# Patient Record
Sex: MALE | Race: WHITE | NOT HISPANIC OR LATINO | Employment: FULL TIME | URBAN - METROPOLITAN AREA
[De-identification: names, ages, dates, MRNs, and addresses within clinical notes are randomized per-mention and may not be internally consistent; named-entity substitution may affect disease eponyms.]

---

## 2021-09-23 ENCOUNTER — OFFICE VISIT (OUTPATIENT)
Dept: URGENT CARE | Facility: PHYSICIAN GROUP | Age: 46
End: 2021-09-23
Payer: COMMERCIAL

## 2021-09-23 ENCOUNTER — APPOINTMENT (OUTPATIENT)
Dept: RADIOLOGY | Facility: IMAGING CENTER | Age: 46
End: 2021-09-23
Attending: PHYSICIAN ASSISTANT
Payer: COMMERCIAL

## 2021-09-23 VITALS
OXYGEN SATURATION: 97 % | DIASTOLIC BLOOD PRESSURE: 76 MMHG | TEMPERATURE: 97.4 F | SYSTOLIC BLOOD PRESSURE: 112 MMHG | HEART RATE: 76 BPM | BODY MASS INDEX: 32.47 KG/M2 | RESPIRATION RATE: 18 BRPM | WEIGHT: 245 LBS | HEIGHT: 73 IN

## 2021-09-23 DIAGNOSIS — S99.922A INJURY OF TOE ON LEFT FOOT, INITIAL ENCOUNTER: ICD-10-CM

## 2021-09-23 DIAGNOSIS — M79.675 TOE PAIN, LEFT: ICD-10-CM

## 2021-09-23 PROCEDURE — 99204 OFFICE O/P NEW MOD 45 MIN: CPT | Performed by: PHYSICIAN ASSISTANT

## 2021-09-23 PROCEDURE — 73660 X-RAY EXAM OF TOE(S): CPT | Mod: TC,LT | Performed by: PHYSICIAN ASSISTANT

## 2021-09-23 NOTE — PROGRESS NOTES
"Subjective:   Junior Kumari is a 45 y.o. male who presents for Foot Pain (possible broken pinky toe,left foot,painful,x3 weeks.)      HPI  45 y.o. male presents to urgent care with new problem to provider of injury to left small toe that occurred about 3 weeks ago.  Patient reports he was walking down a bleacher and kicked a chair causing him to fall forward.  Patient caught himself before falling to the ground and states he had a sudden onset of left small toe pain.  He reports swelling.  Denies previous injury.  He has been taking Tylenol for pain.  Pain is worse with direct pressure from shoes and walking. Denies other associated aggravating or alleviating factors.     Review of Systems   Musculoskeletal:        Toe injury       There is no problem list on file for this patient.    History reviewed. No pertinent surgical history.  Social History     Tobacco Use   • Smoking status: Not on file   Substance Use Topics   • Alcohol use: Not on file   • Drug use: Not on file      History reviewed. No pertinent family history.   (Allergies, Medications, & Tobacco/Substance Use were reconciled by the Medical Assistant and reviewed by myself. The family history is prepopulated)     Objective:     /76 (BP Location: Left arm, Patient Position: Sitting, BP Cuff Size: Adult)   Pulse 76   Temp 36.3 °C (97.4 °F) (Temporal)   Resp 18   Ht 1.854 m (6' 1\")   Wt 111 kg (245 lb)   SpO2 97%   BMI 32.32 kg/m²     Physical Exam  Vitals reviewed.   Constitutional:       Appearance: Normal appearance. He is well-developed.   HENT:      Head: Normocephalic and atraumatic.   Eyes:      Conjunctiva/sclera: Conjunctivae normal.   Cardiovascular:      Rate and Rhythm: Normal rate.   Pulmonary:      Effort: Pulmonary effort is normal. No respiratory distress.   Musculoskeletal:      Cervical back: Normal range of motion and neck supple.        Feet:    Skin:     General: Skin is warm and dry.   Neurological:      Mental " Status: He is alert and oriented to person, place, and time.   Psychiatric:         Mood and Affect: Mood normal.         Behavior: Behavior normal.         Thought Content: Thought content normal.         Judgment: Judgment normal.         Assessment/Plan:     1. Injury of toe on left foot, initial encounter  DX-TOE(S) 2+ LEFT     Narrative & Impression  9/23/2021 9:19 AM  HISTORY/REASON FOR EXAM:  Pain/Deformity Following Trauma; pinky toe injury x 3 weeks.        TECHNIQUE/EXAM DESCRIPTION AND NUMBER OF VIEWS:  3 views of the LEFT toes.  COMPARISON: None  FINDINGS:  No fracture or dislocation is seen. There is an accessory ossicle adjacent to the navicula. Alignment is maintained.     IMPRESSION:  No fracture or dislocation is seen.      Recommend lucius taping of toes. Consider padding for total and shoes.  Referral to podiatry for further evaluation. Weightbearing and activity as tolerated.  Continue with OTC Tylenol and/or ibuprofen for pain.  Patient may apply ice and elevate.  Differential diagnosis, natural history, supportive care, and indications for immediate follow-up discussed.    Advised the patient to follow-up with the primary care physician for recheck, reevaluation, and consideration of further management.  Patient verbalized understanding of treatment plan and has no further questions regarding care.     I personally reviewed prior external notes and test results pertinent to today's visit.     Please note that this dictation was created using voice recognition software. I have made a reasonable attempt to correct obvious errors, but I expect that there are errors of grammar and possibly content that I did not discover before finalizing the note.    This note was electronically signed by Kely Bangura PA-C

## 2022-02-24 ENCOUNTER — OFFICE VISIT (OUTPATIENT)
Dept: URGENT CARE | Facility: CLINIC | Age: 47
End: 2022-02-24
Payer: COMMERCIAL

## 2022-02-24 VITALS
DIASTOLIC BLOOD PRESSURE: 74 MMHG | WEIGHT: 264.2 LBS | TEMPERATURE: 97 F | HEART RATE: 105 BPM | SYSTOLIC BLOOD PRESSURE: 126 MMHG | RESPIRATION RATE: 16 BRPM | HEIGHT: 73 IN | BODY MASS INDEX: 35.02 KG/M2 | OXYGEN SATURATION: 95 %

## 2022-02-24 DIAGNOSIS — R53.83 FATIGUE, UNSPECIFIED TYPE: ICD-10-CM

## 2022-02-24 DIAGNOSIS — J02.9 PHARYNGITIS, UNSPECIFIED ETIOLOGY: ICD-10-CM

## 2022-02-24 LAB
INT CON NEG: NEGATIVE
INT CON POS: POSITIVE
S PYO AG THROAT QL: NEGATIVE

## 2022-02-24 PROCEDURE — 99213 OFFICE O/P EST LOW 20 MIN: CPT | Mod: CS | Performed by: PHYSICIAN ASSISTANT

## 2022-02-24 PROCEDURE — 87880 STREP A ASSAY W/OPTIC: CPT | Performed by: PHYSICIAN ASSISTANT

## 2022-02-24 RX ORDER — DEXAMETHASONE SODIUM PHOSPHATE 10 MG/ML
10 INJECTION INTRAMUSCULAR; INTRAVENOUS ONCE
Status: COMPLETED | OUTPATIENT
Start: 2022-02-24 | End: 2022-02-24

## 2022-02-24 RX ADMIN — DEXAMETHASONE SODIUM PHOSPHATE 10 MG: 10 INJECTION INTRAMUSCULAR; INTRAVENOUS at 09:35

## 2022-02-24 ASSESSMENT — ENCOUNTER SYMPTOMS
SHORTNESS OF BREATH: 0
CHILLS: 0
SORE THROAT: 1
SPUTUM PRODUCTION: 0
MYALGIAS: 1
DIZZINESS: 0
COUGH: 0
DIARRHEA: 0
HEADACHES: 0
SINUS PAIN: 0
VOMITING: 0
FEVER: 0
PALPITATIONS: 0
DIAPHORESIS: 0
ABDOMINAL PAIN: 0
WHEEZING: 0
NAUSEA: 0

## 2022-02-24 NOTE — PROGRESS NOTES
"Subjective:   Junior Kumari is a 46 y.o. male who presents for Pharyngitis (Fatigue, sore throat, x4 days)    HPI:  This is a very pleasant 46-year-old male presenting to the clinic with sore throat, fatigue and congestion x4 days.  Patient has been running a subjective fever.  Also describes generalized body aches and chills at the onset of his symptoms.  Sore throat is moderately painful every time he swallows.  No difficulty swallowing or handling secretions.  Denies any cough, congestion or shortness of breath.  He has had no known ill contacts.  Has been vaccinated for COVID-19.      Review of Systems   Constitutional: Positive for malaise/fatigue. Negative for chills, diaphoresis and fever.   HENT: Positive for congestion and sore throat. Negative for ear pain and sinus pain.    Respiratory: Negative for cough, sputum production, shortness of breath and wheezing.    Cardiovascular: Negative for chest pain and palpitations.   Gastrointestinal: Negative for abdominal pain, diarrhea, nausea and vomiting.   Musculoskeletal: Positive for myalgias.   Neurological: Negative for dizziness and headaches.       Medications:    • This patient does not have an active medication from one of the medication groupers.    Allergies: Patient has no known allergies.    Problem List: Junior Kumari does not have a problem list on file.    Surgical History:  No past surgical history on file.    Past Social Hx: Junior Kumari  reports that he has never smoked. He has never used smokeless tobacco.     Past Family Hx:  Junior Kumari family history is not on file.     Problem list, medications, and allergies reviewed by myself today in Epic.     Objective:     /74 (BP Location: Left arm, Patient Position: Sitting, BP Cuff Size: Adult long)   Pulse (!) 105   Temp 36.1 °C (97 °F) (Temporal)   Resp 16   Ht 1.854 m (6' 1\")   Wt 120 kg (264 lb 3.2 oz)   SpO2 95%   BMI 34.86 kg/m²     Physical " Exam  Constitutional:       General: He is not in acute distress.     Appearance: Normal appearance. He is not ill-appearing, toxic-appearing or diaphoretic.   HENT:      Head: Normocephalic and atraumatic.      Right Ear: Tympanic membrane, ear canal and external ear normal.      Left Ear: Tympanic membrane, ear canal and external ear normal.      Nose: Congestion present. No rhinorrhea.      Mouth/Throat:      Mouth: Mucous membranes are moist.      Pharynx: Posterior oropharyngeal erythema present. No oropharyngeal exudate.      Comments: Posterior oropharynx erythematous.  2+ tonsillar edema without exudate.  Midline uvula.  Eyes:      Conjunctiva/sclera: Conjunctivae normal.   Cardiovascular:      Rate and Rhythm: Normal rate and regular rhythm.      Pulses: Normal pulses.      Heart sounds: Normal heart sounds.   Pulmonary:      Effort: Pulmonary effort is normal.      Breath sounds: Normal breath sounds. No wheezing, rhonchi or rales.   Musculoskeletal:      Cervical back: Normal range of motion. No muscular tenderness.   Lymphadenopathy:      Cervical: No cervical adenopathy.   Skin:     General: Skin is warm and dry.      Capillary Refill: Capillary refill takes less than 2 seconds.   Neurological:      Mental Status: He is alert.   Psychiatric:         Mood and Affect: Mood normal.         Thought Content: Thought content normal.       POCT strep: Negative  POCT COVID: Negative    Assessment/Plan:     Comments/MDM:     Discussed viral etiology of URI.   10 mg oral Decadron provided in clinic for pharyngitis.    Symptomatic care.  Oral hydration and rest.   Over the counter expectorant as directed; Guaifenesin (Mucinex).  Diphenhydramine as directed for rhinorrhea (runny nose) and sneezing.  May take over the counter pseudoephedrine for nasal congestion. Can increase your blood pressure.  Tylenol or ibuprofen for pain and fever as directed.   Saline nasal spray as a decongestant.    Follow up for shortness  of breath, fevers, elevated heart rate, weakness, prolonged cough, chest pain, or any other concerns.     Diagnosis and associated orders:     1. Pharyngitis, unspecified etiology  POCT Rapid Strep A    POCT SARS-COV Antigen MARY (Symptomatic only)   2. Fatigue, unspecified type  POCT Rapid Strep A    POCT SARS-COV Antigen MARY (Symptomatic only)            Differential diagnosis, natural history, supportive care, and indications for immediate follow-up discussed.    I personally reviewed prior external notes and test results pertinent to today's visit.     Advised the patient to follow-up with the primary care physician for recheck, reevaluation, and consideration of further management.    Please note that this dictation was created using voice recognition software. I have made reasonable attempt to correct obvious errors, but I expect that there are errors of grammar and possibly content that I did not discover before finalizing the note.    This note was electronically signed by DOMINIK Lozoya PA-C

## 2022-03-01 ENCOUNTER — OFFICE VISIT (OUTPATIENT)
Dept: URGENT CARE | Facility: CLINIC | Age: 47
End: 2022-03-01
Payer: COMMERCIAL

## 2022-03-01 ENCOUNTER — HOSPITAL ENCOUNTER (OUTPATIENT)
Facility: MEDICAL CENTER | Age: 47
End: 2022-03-01
Attending: PHYSICIAN ASSISTANT
Payer: COMMERCIAL

## 2022-03-01 VITALS
SYSTOLIC BLOOD PRESSURE: 130 MMHG | BODY MASS INDEX: 33.62 KG/M2 | RESPIRATION RATE: 20 BRPM | HEIGHT: 74 IN | HEART RATE: 110 BPM | DIASTOLIC BLOOD PRESSURE: 80 MMHG | TEMPERATURE: 96.7 F | OXYGEN SATURATION: 94 % | WEIGHT: 262 LBS

## 2022-03-01 DIAGNOSIS — J02.9 PHARYNGITIS, UNSPECIFIED ETIOLOGY: ICD-10-CM

## 2022-03-01 DIAGNOSIS — J01.00 ACUTE MAXILLARY SINUSITIS, RECURRENCE NOT SPECIFIED: ICD-10-CM

## 2022-03-01 LAB
HETEROPH AB SER QL LA: NEGATIVE
INT CON NEG: NEGATIVE
INT CON POS: POSITIVE

## 2022-03-01 PROCEDURE — 86308 HETEROPHILE ANTIBODY SCREEN: CPT | Performed by: PHYSICIAN ASSISTANT

## 2022-03-01 PROCEDURE — 87077 CULTURE AEROBIC IDENTIFY: CPT

## 2022-03-01 PROCEDURE — 87070 CULTURE OTHR SPECIMN AEROBIC: CPT

## 2022-03-01 PROCEDURE — 99214 OFFICE O/P EST MOD 30 MIN: CPT | Performed by: PHYSICIAN ASSISTANT

## 2022-03-01 RX ORDER — AMOXICILLIN AND CLAVULANATE POTASSIUM 875; 125 MG/1; MG/1
1 TABLET, FILM COATED ORAL 2 TIMES DAILY
Qty: 20 TABLET | Refills: 0 | Status: SHIPPED | OUTPATIENT
Start: 2022-03-01 | End: 2022-03-11

## 2022-03-01 RX ORDER — METHYLPREDNISOLONE 4 MG/1
TABLET ORAL
Qty: 21 TABLET | Refills: 0 | Status: SHIPPED | OUTPATIENT
Start: 2022-03-01 | End: 2023-04-20

## 2022-03-01 RX ORDER — METHYLPREDNISOLONE 4 MG/1
TABLET ORAL
Qty: 21 TABLET | Refills: 0 | Status: SHIPPED | OUTPATIENT
Start: 2022-03-01 | End: 2022-03-01 | Stop reason: SDUPTHER

## 2022-03-01 RX ORDER — AMOXICILLIN AND CLAVULANATE POTASSIUM 875; 125 MG/1; MG/1
1 TABLET, FILM COATED ORAL 2 TIMES DAILY
Qty: 20 TABLET | Refills: 0 | Status: SHIPPED | OUTPATIENT
Start: 2022-03-01 | End: 2022-03-01 | Stop reason: SDUPTHER

## 2022-03-01 ASSESSMENT — ENCOUNTER SYMPTOMS
VOMITING: 0
NECK PAIN: 0
SINUS PAIN: 1
MYALGIAS: 0
COUGH: 1
SHORTNESS OF BREATH: 0
SORE THROAT: 1
ABDOMINAL PAIN: 0
SWOLLEN GLANDS: 1
FEVER: 1
HEADACHES: 0
SPUTUM PRODUCTION: 1
CHILLS: 1
NAUSEA: 0
WHEEZING: 0
DIARRHEA: 0

## 2022-03-01 NOTE — PROGRESS NOTES
"Subjective     Jnuior Kumari is a 46 y.o. male who presents with Pharyngitis (Recently seen/worse, hard to swallow. Hard to sleep. Congestion, phlegm. Strep A, COVID Neg. X Week 2  As of 2/24 SX were on day 4/5.)            Pharyngitis   This is a new problem. Episode onset: 10 days  The problem has been unchanged. Maximum temperature: subjective  The pain is moderate. Associated symptoms include congestion, coughing (productive of yellow mucous ) and swollen glands. Pertinent negatives include no abdominal pain, diarrhea, ear pain, headaches, plugged ear sensation, neck pain, shortness of breath or vomiting. Associated symptoms comments: Sinus pressure and congestion. Treatments tried: 10 mg of Decadron.   The patient was seen 6 days ago. He was tested for covid and strep with rapid testing and those tests both came back negative.      No past medical history on file.    No past surgical history on file.    No family history on file.    No Known Allergies    Medications, Allergies, and current problem list reviewed today in Epic      Review of Systems   Constitutional: Positive for chills, fever (subjective ) and malaise/fatigue.   HENT: Positive for congestion, sinus pain and sore throat. Negative for ear pain.    Respiratory: Positive for cough (productive of yellow mucous ) and sputum production. Negative for shortness of breath and wheezing.    Gastrointestinal: Negative for abdominal pain, diarrhea, nausea and vomiting.   Musculoskeletal: Negative for myalgias and neck pain.   Skin: Negative for rash.   Neurological: Negative for headaches.              Objective     /80   Pulse (!) 110   Temp 35.9 °C (96.7 °F)   Resp 20   Ht 1.88 m (6' 2\")   Wt 119 kg (262 lb)   SpO2 94%   BMI 33.64 kg/m²      Physical Exam  Constitutional:       General: He is not in acute distress.     Appearance: Normal appearance. He is not ill-appearing.   HENT:      Head: Normocephalic and atraumatic.      Nose: Mucosal " edema, congestion and rhinorrhea present.      Right Sinus: Maxillary sinus tenderness present.      Left Sinus: Maxillary sinus tenderness present.      Mouth/Throat:      Mouth: Mucous membranes are moist.      Pharynx: Posterior oropharyngeal erythema present. No oropharyngeal exudate.   Cardiovascular:      Rate and Rhythm: Normal rate and regular rhythm.      Heart sounds: Normal heart sounds. No murmur heard.  Pulmonary:      Effort: Pulmonary effort is normal. No respiratory distress.      Breath sounds: Normal breath sounds. No stridor. No wheezing, rhonchi or rales.   Lymphadenopathy:      Cervical: Cervical adenopathy (mild anterior- NTTP) present.   Skin:     General: Skin is warm and dry.      Findings: No rash.   Neurological:      General: No focal deficit present.      Mental Status: He is alert and oriented to person, place, and time.   Psychiatric:         Mood and Affect: Mood normal.         Behavior: Behavior normal.         Thought Content: Thought content normal.         Judgment: Judgment normal.                             Assessment & Plan        1. Pharyngitis, unspecified etiology  POCT Mononucleosis (mono)    CULTURE THROAT    amoxicillin-clavulanate (AUGMENTIN) 875-125 MG Tab    methylPREDNISolone (MEDROL DOSEPAK) 4 MG Tablet Therapy Pack   2. Acute maxillary sinusitis, recurrence not specified  amoxicillin-clavulanate (AUGMENTIN) 875-125 MG Tab    methylPREDNISolone (MEDROL DOSEPAK) 4 MG Tablet Therapy Pack     poct mono- negative    Throat culture  Pending.      Current Outpatient Medications:   •  amoxicillin-clavulanate (AUGMENTIN) 875-125 MG Tab, Take 1 Tablet by mouth 2 times a day for 10 days., Disp: 20 Tablet, Rfl: 0  •  methylPREDNISolone (MEDROL DOSEPAK) 4 MG Tablet Therapy Pack, Follow schedule on package instructions., Disp: 21 Tablet, Rfl: 0      Differential diagnoses, Supportive care, and indications for immediate follow-up discussed with patient.   Pathogenesis of  diagnosis discussed including typical length and natural progression.   Instructed to return to clinic or nearest emergency department for any change in condition, further concerns, or worsening of symptoms.    The patient demonstrated a good understanding and agreed with the treatment plan.    Donna Tam P.A.-C.

## 2022-03-02 DIAGNOSIS — J02.9 PHARYNGITIS, UNSPECIFIED ETIOLOGY: ICD-10-CM

## 2022-03-04 LAB
BACTERIA SPEC RESP CULT: ABNORMAL
BACTERIA SPEC RESP CULT: ABNORMAL
SIGNIFICANT IND 70042: ABNORMAL
SITE SITE: ABNORMAL
SOURCE SOURCE: ABNORMAL

## 2023-03-28 ENCOUNTER — TELEPHONE (OUTPATIENT)
Dept: CARDIOLOGY | Facility: MEDICAL CENTER | Age: 48
End: 2023-03-28
Payer: COMMERCIAL

## 2023-03-28 NOTE — TELEPHONE ENCOUNTER
Chart Prep    Called patient in regards to records for NP appointment with [facility]. To review most recent lab results, ekg, any cardiac testing or ,if he has been treated by a cardiologist. No answer, left voicemail to call back

## 2023-04-20 ENCOUNTER — TELEPHONE (OUTPATIENT)
Dept: CARDIOLOGY | Facility: MEDICAL CENTER | Age: 48
End: 2023-04-20

## 2023-04-20 PROBLEM — R13.10 DYSPHAGIA: Status: ACTIVE | Noted: 2019-09-25

## 2023-04-20 PROBLEM — K21.9 GASTROESOPHAGEAL REFLUX DISEASE: Status: ACTIVE | Noted: 2019-09-25

## 2023-05-03 SDOH — ECONOMIC STABILITY: FOOD INSECURITY: WITHIN THE PAST 12 MONTHS, YOU WORRIED THAT YOUR FOOD WOULD RUN OUT BEFORE YOU GOT MONEY TO BUY MORE.: NEVER TRUE

## 2023-05-03 SDOH — ECONOMIC STABILITY: HOUSING INSECURITY
IN THE LAST 12 MONTHS, WAS THERE A TIME WHEN YOU DID NOT HAVE A STEADY PLACE TO SLEEP OR SLEPT IN A SHELTER (INCLUDING NOW)?: NO

## 2023-05-03 SDOH — HEALTH STABILITY: MENTAL HEALTH
STRESS IS WHEN SOMEONE FEELS TENSE, NERVOUS, ANXIOUS, OR CAN'T SLEEP AT NIGHT BECAUSE THEIR MIND IS TROUBLED. HOW STRESSED ARE YOU?: TO SOME EXTENT

## 2023-05-03 SDOH — HEALTH STABILITY: PHYSICAL HEALTH: ON AVERAGE, HOW MANY MINUTES DO YOU ENGAGE IN EXERCISE AT THIS LEVEL?: 30 MIN

## 2023-05-03 SDOH — ECONOMIC STABILITY: INCOME INSECURITY: HOW HARD IS IT FOR YOU TO PAY FOR THE VERY BASICS LIKE FOOD, HOUSING, MEDICAL CARE, AND HEATING?: SOMEWHAT HARD

## 2023-05-03 SDOH — ECONOMIC STABILITY: TRANSPORTATION INSECURITY
IN THE PAST 12 MONTHS, HAS THE LACK OF TRANSPORTATION KEPT YOU FROM MEDICAL APPOINTMENTS OR FROM GETTING MEDICATIONS?: NO

## 2023-05-03 SDOH — ECONOMIC STABILITY: FOOD INSECURITY: WITHIN THE PAST 12 MONTHS, THE FOOD YOU BOUGHT JUST DIDN'T LAST AND YOU DIDN'T HAVE MONEY TO GET MORE.: NEVER TRUE

## 2023-05-03 SDOH — ECONOMIC STABILITY: INCOME INSECURITY: IN THE LAST 12 MONTHS, WAS THERE A TIME WHEN YOU WERE NOT ABLE TO PAY THE MORTGAGE OR RENT ON TIME?: YES

## 2023-05-03 SDOH — HEALTH STABILITY: PHYSICAL HEALTH: ON AVERAGE, HOW MANY DAYS PER WEEK DO YOU ENGAGE IN MODERATE TO STRENUOUS EXERCISE (LIKE A BRISK WALK)?: 5 DAYS

## 2023-05-03 SDOH — ECONOMIC STABILITY: HOUSING INSECURITY: IN THE LAST 12 MONTHS, HOW MANY PLACES HAVE YOU LIVED?: 1

## 2023-05-03 SDOH — ECONOMIC STABILITY: HOUSING INSECURITY
IN THE LAST 12 MONTHS, WAS THERE A TIME WHEN YOU DID NOT HAVE A STEADY PLACE TO SLEEP OR SLEPT IN A SHELTER (INCLUDING NOW)?: YES

## 2023-05-03 SDOH — ECONOMIC STABILITY: TRANSPORTATION INSECURITY
IN THE PAST 12 MONTHS, HAS LACK OF RELIABLE TRANSPORTATION KEPT YOU FROM MEDICAL APPOINTMENTS, MEETINGS, WORK OR FROM GETTING THINGS NEEDED FOR DAILY LIVING?: NO

## 2023-05-03 SDOH — ECONOMIC STABILITY: TRANSPORTATION INSECURITY
IN THE PAST 12 MONTHS, HAS LACK OF TRANSPORTATION KEPT YOU FROM MEETINGS, WORK, OR FROM GETTING THINGS NEEDED FOR DAILY LIVING?: NO

## 2023-05-03 ASSESSMENT — LIFESTYLE VARIABLES
HOW OFTEN DO YOU HAVE A DRINK CONTAINING ALCOHOL: NEVER
HOW OFTEN DO YOU HAVE SIX OR MORE DRINKS ON ONE OCCASION: NEVER
AUDIT-C TOTAL SCORE: 0
SKIP TO QUESTIONS 9-10: 1
HOW MANY STANDARD DRINKS CONTAINING ALCOHOL DO YOU HAVE ON A TYPICAL DAY: PATIENT DOES NOT DRINK

## 2023-05-03 ASSESSMENT — SOCIAL DETERMINANTS OF HEALTH (SDOH)
HOW OFTEN DO YOU GET TOGETHER WITH FRIENDS OR RELATIVES?: TWICE A WEEK
DO YOU BELONG TO ANY CLUBS OR ORGANIZATIONS SUCH AS CHURCH GROUPS UNIONS, FRATERNAL OR ATHLETIC GROUPS, OR SCHOOL GROUPS?: YES
HOW MANY DRINKS CONTAINING ALCOHOL DO YOU HAVE ON A TYPICAL DAY WHEN YOU ARE DRINKING: PATIENT DOES NOT DRINK
HOW OFTEN DO YOU ATTEND CHURCH OR RELIGIOUS SERVICES?: MORE THAN 4 TIMES PER YEAR
IN A TYPICAL WEEK, HOW MANY TIMES DO YOU TALK ON THE PHONE WITH FAMILY, FRIENDS, OR NEIGHBORS?: MORE THAN THREE TIMES A WEEK
HOW OFTEN DO YOU ATTENT MEETINGS OF THE CLUB OR ORGANIZATION YOU BELONG TO?: MORE THAN 4 TIMES PER YEAR
WITHIN THE PAST 12 MONTHS, YOU WORRIED THAT YOUR FOOD WOULD RUN OUT BEFORE YOU GOT THE MONEY TO BUY MORE: NEVER TRUE
DO YOU BELONG TO ANY CLUBS OR ORGANIZATIONS SUCH AS CHURCH GROUPS UNIONS, FRATERNAL OR ATHLETIC GROUPS, OR SCHOOL GROUPS?: YES
IN A TYPICAL WEEK, HOW MANY TIMES DO YOU TALK ON THE PHONE WITH FAMILY, FRIENDS, OR NEIGHBORS?: MORE THAN THREE TIMES A WEEK
HOW OFTEN DO YOU GET TOGETHER WITH FRIENDS OR RELATIVES?: TWICE A WEEK
HOW OFTEN DO YOU ATTENT MEETINGS OF THE CLUB OR ORGANIZATION YOU BELONG TO?: MORE THAN 4 TIMES PER YEAR
HOW OFTEN DO YOU HAVE SIX OR MORE DRINKS ON ONE OCCASION: NEVER
HOW OFTEN DO YOU HAVE A DRINK CONTAINING ALCOHOL: NEVER
HOW HARD IS IT FOR YOU TO PAY FOR THE VERY BASICS LIKE FOOD, HOUSING, MEDICAL CARE, AND HEATING?: SOMEWHAT HARD
HOW OFTEN DO YOU ATTEND CHURCH OR RELIGIOUS SERVICES?: MORE THAN 4 TIMES PER YEAR

## 2023-05-04 ENCOUNTER — OFFICE VISIT (OUTPATIENT)
Dept: MEDICAL GROUP | Facility: MEDICAL CENTER | Age: 48
End: 2023-05-04
Payer: COMMERCIAL

## 2023-05-04 VITALS
HEART RATE: 86 BPM | TEMPERATURE: 97.8 F | WEIGHT: 264.4 LBS | DIASTOLIC BLOOD PRESSURE: 72 MMHG | SYSTOLIC BLOOD PRESSURE: 108 MMHG | HEIGHT: 73 IN | OXYGEN SATURATION: 95 % | BODY MASS INDEX: 35.04 KG/M2

## 2023-05-04 DIAGNOSIS — R79.89 ELEVATED TSH: ICD-10-CM

## 2023-05-04 DIAGNOSIS — R73.03 PREDIABETES: ICD-10-CM

## 2023-05-04 DIAGNOSIS — K75.81 NASH (NONALCOHOLIC STEATOHEPATITIS): ICD-10-CM

## 2023-05-04 DIAGNOSIS — K90.41 GLUTEN INTOLERANCE: ICD-10-CM

## 2023-05-04 DIAGNOSIS — Z11.59 NEED FOR HEPATITIS C SCREENING TEST: ICD-10-CM

## 2023-05-04 DIAGNOSIS — Z13.21 ENCOUNTER FOR VITAMIN DEFICIENCY SCREENING: ICD-10-CM

## 2023-05-04 DIAGNOSIS — E66.9 OBESITY (BMI 30.0-34.9): ICD-10-CM

## 2023-05-04 DIAGNOSIS — R06.83 SNORES: ICD-10-CM

## 2023-05-04 DIAGNOSIS — E78.2 MIXED HYPERLIPIDEMIA: ICD-10-CM

## 2023-05-04 PROBLEM — E66.811 OBESITY (BMI 30.0-34.9): Status: ACTIVE | Noted: 2023-05-04

## 2023-05-04 PROCEDURE — 99214 OFFICE O/P EST MOD 30 MIN: CPT | Performed by: FAMILY MEDICINE

## 2023-05-04 RX ORDER — METFORMIN HYDROCHLORIDE 500 MG/1
500 TABLET, EXTENDED RELEASE ORAL DAILY
Qty: 90 TABLET | Refills: 0 | Status: SHIPPED | OUTPATIENT
Start: 2023-05-04 | End: 2024-03-25 | Stop reason: SDUPTHER

## 2023-05-04 RX ORDER — ROSUVASTATIN CALCIUM 10 MG/1
10 TABLET, COATED ORAL EVERY EVENING
Qty: 90 TABLET | Refills: 3 | Status: SHIPPED | OUTPATIENT
Start: 2023-05-04 | End: 2024-03-25 | Stop reason: SDUPTHER

## 2023-05-04 ASSESSMENT — PATIENT HEALTH QUESTIONNAIRE - PHQ9: CLINICAL INTERPRETATION OF PHQ2 SCORE: 0

## 2023-05-04 NOTE — PROGRESS NOTES
Subjective:     CC:  Diagnoses of Prediabetes, Mixed hyperlipidemia, Obesity (BMI 30.0-34.9), Snores, Elevated TSH, Encounter for vitamin deficiency screening, Gluten intolerance, SWIFT (nonalcoholic steatohepatitis), and Need for hepatitis C screening test were pertinent to this visit.    HISTORY OF THE PRESENT ILLNESS: Patient is a 47 y.o. male. This pleasant patient is here today to establish care and discuss recent lab work.     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  Does get heart burn and takes prilosec occassionally  He had very recent labs with pertinent positive as follows  Total cholesterol 259    HDL 46  Apolipoprotein B 134  AST 52  ALT 99  A1c 5.8  TSH 5.080  TPO antibody >600  Vitamin D 25.7  Cardiac CRP 2.62  Markers for food allergies to gluten and almonds  Trying Intermittent fasting  Recent EKG reviewed and does show left axis deviation      Problem   Prediabetes   Obesity (Bmi 30.0-34.9)   Snores   Elevated Tsh       Current Outpatient Medications Ordered in Epic   Medication Sig Dispense Refill    rosuvastatin (CRESTOR) 10 MG Tab Take 1 Tablet by mouth every evening. 90 Tablet 3    metFORMIN ER (GLUCOPHAGE XR) 500 MG TABLET SR 24 HR Take 1 Tablet by mouth every day. 90 Tablet 0     No current Epic-ordered facility-administered medications on file.     Family History   Problem Relation Age of Onset    Stroke Father 62    Thyroid Sister     Thyroid Sister     Thyroid Sister         Health Maintenance: will need to discuss colonoscopy at next appointment    ROS:   ROS see HPI      Objective:       Exam: /72 (BP Location: Right arm, Patient Position:  "Sitting, BP Cuff Size: Large adult)   Pulse 86   Temp 36.6 °C (97.8 °F) (Temporal)   Ht 1.854 m (6' 1\")   Wt 120 kg (264 lb 6.4 oz)   SpO2 95%  Body mass index is 34.88 kg/m².    Physical Exam  Vitals reviewed.   Constitutional:       General: He is not in acute distress.     Appearance: Normal appearance.   HENT:      Head: Normocephalic and atraumatic.   Cardiovascular:      Rate and Rhythm: Normal rate and regular rhythm.      Heart sounds: Normal heart sounds.   Pulmonary:      Effort: Pulmonary effort is normal. No respiratory distress.      Breath sounds: Normal breath sounds.   Skin:     General: Skin is warm and dry.   Neurological:      Mental Status: He is alert. Mental status is at baseline.   Psychiatric:         Mood and Affect: Mood normal.         Behavior: Behavior normal.         STOPBANG - Sleep Apnea Screening      Flowsheet Row Most Recent Value   S - Have you been told that you SNORE? Yes   T - Are you often TIRED during the day? Yes   O - Do you know if you stop breathing or has anyone witnessed you stop breathing while you were asleep? (OBSTRUCTION) Yes   P - Do you have high blood PRESSURE or on medication to control high blood pressure? No   B - Is your Body Mass Index greater than 35? (BMI) No   A - Are you 50 years old or older? (AGE) No   N - Are you a male with a NECK circumference greater than 17 inches, or a female with a neck circumference greater than 16 inches? Yes   G - Are you male? (GENDER) Yes   STOPBANG Total Score 5   ROSALIE Risk High Risk              Assessment & Plan:   47 y.o. male with the following -    1. Prediabetes  A1C 5.8  Discussed low carb, whole food diet  Patient to work on slowly increasing activity level with daily walking  Will trial low dose metformin  - metFORMIN ER (GLUCOPHAGE XR) 500 MG TABLET SR 24 HR; Take 1 Tablet by mouth every day.  Dispense: 90 Tablet; Refill: 0  - CBC WITHOUT DIFFERENTIAL; Future  - HEMOGLOBIN A1C; Future    2. Mixed " hyperlipidemia  Elevated Lipid panel and elevated liver enzymes  Given family history and personal risk factors will initiate statin but will need to monitor for side effects and liver function closely  - rosuvastatin (CRESTOR) 10 MG Tab; Take 1 Tablet by mouth every evening.  Dispense: 90 Tablet; Refill: 3  - CBC WITHOUT DIFFERENTIAL; Future  - Lipid Profile; Future  - CT-CARDIAC SCORING; Future  - REFERRAL TO CARDIOLOGY  - CRP HIGH SENSITIVE (CARDIAC); Future    3. Obesity (BMI 30.0-34.9)  - CBC WITHOUT DIFFERENTIAL; Future  - Comp Metabolic Panel; Future  - HEMOGLOBIN A1C; Future  - TSH; Future  - Lipid Profile; Future    4. Snores  - Referral to Pulmonary and Sleep Medicine    5. Elevated TSH  - TSH; Future  - FREE THYROXINE; Future    6. Encounter for vitamin deficiency screening  - VITAMIN B12; Future  - IRON/TOTAL IRON BIND; Future  - VITAMIN D,25 HYDROXY (DEFICIENCY); Future  - FERRITIN; Future    7. Gluten intolerance  - IRON/TOTAL IRON BIND; Future  - FERRITIN; Future    8. SWIFT (nonalcoholic steatohepatitis)  - Comp Metabolic Panel; Future  - US-RUQ; Future  - HEP C VIRUS ANTIBODY; Future    9. Need for hepatitis C screening test  - HEP C VIRUS ANTIBODY; Future        Return in about 3 months (around 8/4/2023) for Lab F/U, Medication F/U, Imaging F/U.    Please note that this dictation was created using voice recognition software. I have made every reasonable attempt to correct obvious errors, but I expect that there are errors of grammar and possibly content that I did not discover before finalizing the note.

## 2023-05-05 ENCOUNTER — HOSPITAL ENCOUNTER (OUTPATIENT)
Dept: RADIOLOGY | Facility: MEDICAL CENTER | Age: 48
End: 2023-05-05
Attending: FAMILY MEDICINE
Payer: COMMERCIAL

## 2023-05-05 DIAGNOSIS — K75.81 NASH (NONALCOHOLIC STEATOHEPATITIS): ICD-10-CM

## 2023-05-05 PROCEDURE — 76705 ECHO EXAM OF ABDOMEN: CPT

## 2023-06-18 ENCOUNTER — HOSPITAL ENCOUNTER (EMERGENCY)
Facility: MEDICAL CENTER | Age: 48
End: 2023-06-19
Attending: STUDENT IN AN ORGANIZED HEALTH CARE EDUCATION/TRAINING PROGRAM
Payer: COMMERCIAL

## 2023-06-18 DIAGNOSIS — S01.81XA FACIAL LACERATION, INITIAL ENCOUNTER: ICD-10-CM

## 2023-06-18 PROCEDURE — 303747 HCHG EXTRA SUTURE

## 2023-06-18 PROCEDURE — 304999 HCHG REPAIR-SIMPLE/INTERMED LEVEL 1

## 2023-06-18 PROCEDURE — 99282 EMERGENCY DEPT VISIT SF MDM: CPT

## 2023-06-18 PROCEDURE — 304217 HCHG IRRIGATION SYSTEM

## 2023-06-18 RX ORDER — LIDOCAINE HYDROCHLORIDE AND EPINEPHRINE BITARTRATE 20; .01 MG/ML; MG/ML
10 INJECTION, SOLUTION SUBCUTANEOUS ONCE
Status: DISCONTINUED | OUTPATIENT
Start: 2023-06-19 | End: 2023-06-18

## 2023-06-18 RX ORDER — LIDOCAINE HYDROCHLORIDE AND EPINEPHRINE BITARTRATE 20; .01 MG/ML; MG/ML
20 INJECTION, SOLUTION SUBCUTANEOUS ONCE
Status: DISCONTINUED | OUTPATIENT
Start: 2023-06-19 | End: 2023-06-19

## 2023-06-18 RX ORDER — LIDOCAINE HCL/EPINEPHRINE/PF 2%-1:200K
VIAL (ML) INJECTION
Status: DISCONTINUED
Start: 2023-06-18 | End: 2023-06-19 | Stop reason: HOSPADM

## 2023-06-19 VITALS
TEMPERATURE: 98 F | OXYGEN SATURATION: 94 % | RESPIRATION RATE: 18 BRPM | DIASTOLIC BLOOD PRESSURE: 79 MMHG | SYSTOLIC BLOOD PRESSURE: 118 MMHG | BODY MASS INDEX: 34.07 KG/M2 | HEIGHT: 73 IN | WEIGHT: 257.06 LBS | HEART RATE: 91 BPM

## 2023-06-19 PROCEDURE — 303747 HCHG EXTRA SUTURE

## 2023-06-19 PROCEDURE — 90471 IMMUNIZATION ADMIN: CPT

## 2023-06-19 PROCEDURE — 304217 HCHG IRRIGATION SYSTEM

## 2023-06-19 PROCEDURE — 90715 TDAP VACCINE 7 YRS/> IM: CPT | Performed by: STUDENT IN AN ORGANIZED HEALTH CARE EDUCATION/TRAINING PROGRAM

## 2023-06-19 PROCEDURE — 700111 HCHG RX REV CODE 636 W/ 250 OVERRIDE (IP): Performed by: STUDENT IN AN ORGANIZED HEALTH CARE EDUCATION/TRAINING PROGRAM

## 2023-06-19 PROCEDURE — 304999 HCHG REPAIR-SIMPLE/INTERMED LEVEL 1

## 2023-06-19 RX ORDER — LIDOCAINE HCL/EPINEPHRINE/PF 2%-1:200K
20 VIAL (ML) INJECTION ONCE
Status: DISCONTINUED | OUTPATIENT
Start: 2023-06-19 | End: 2023-06-19 | Stop reason: HOSPADM

## 2023-06-19 RX ADMIN — CLOSTRIDIUM TETANI TOXOID ANTIGEN (FORMALDEHYDE INACTIVATED), CORYNEBACTERIUM DIPHTHERIAE TOXOID ANTIGEN (FORMALDEHYDE INACTIVATED), BORDETELLA PERTUSSIS TOXOID ANTIGEN (GLUTARALDEHYDE INACTIVATED), BORDETELLA PERTUSSIS FILAMENTOUS HEMAGGLUTININ ANTIGEN (FORMALDEHYDE INACTIVATED), BORDETELLA PERTUSSIS PERTACTIN ANTIGEN, AND BORDETELLA PERTUSSIS FIMBRIAE 2/3 ANTIGEN 0.5 ML: 5; 2; 2.5; 5; 3; 5 INJECTION, SUSPENSION INTRAMUSCULAR at 00:32

## 2023-06-19 NOTE — ED PROVIDER NOTES
"ED Provider Note    CHIEF COMPLAINT  Chief Complaint   Patient presents with    Laceration     Pt was chopping fire woods and it flipped - hit on his left eyebrow about 30 minutes ago, sustained cut wound on the inner side of the left eyebrow. Bleeding noted during that time and applied pressure. Denies LOC and blood thinner medication. No active bleeding        EXTERNAL RECORDS REVIEWED  Outpatient notes, prediabetes, obesity, hyperlipidemia; immunization records reviewed, last tetanus documented 2014     HPI/ROS  LIMITATION TO HISTORY   Select: : None    Junior Kumari is a 47 y.o. male who presents with laceration to the left eyebrow.  Patient was chopping wood and a piece of wood slipped and hit his left eyebrow about 30 minutes ago.  Denies LOC.  Not on any blood thinners.  Denies any vision changes.    PAST MEDICAL HISTORY  Past Medical History:   Diagnosis Date    Hyperlipemia     Hypothyroidism         SURGICAL HISTORY  History reviewed. No pertinent surgical history.     FAMILY HISTORY  Family History   Problem Relation Age of Onset    Stroke Father 62    Thyroid Sister     Thyroid Sister     Thyroid Sister        SOCIAL HISTORY       CURRENT MEDICATIONS  Home Medications    Medication Sig Taking? Last Dose Authorizing Provider   rosuvastatin (CRESTOR) 10 MG Tab Take 1 Tablet by mouth every evening.   Tramaine Drake D.O.   metFORMIN ER (GLUCOPHAGE XR) 500 MG TABLET SR 24 HR Take 1 Tablet by mouth every day.   Tramaine Drake D.O.       ALLERGIES  No Known Allergies    PHYSICAL EXAM  /79   Pulse 91   Temp 36.7 °C (98 °F) (Temporal)   Resp 18   Ht 1.854 m (6' 1\")   Wt 117 kg (257 lb 0.9 oz)   SpO2 94%   Constitutional: Alert in no apparent distress.  HENT: 1.5 cm laceration medial and inferior to the left eyebrow, Bilateral external ears normal, Nose normal.   Eyes: Pupils are equal and reactive, Conjunctiva normal, Non-icteric.   Neck: Normal range of motion, No tenderness, Supple, No " stridor.   Cardiovascular: Regular rate and rhythm, no murmurs.   Thorax & Lungs: Normal breath sounds, No respiratory distress, No wheezing  Skin: Warm, Dry, No erythema, No rash.   Extremities: Intact distal pulses, No edema, No tenderness, No cyanosis  Neurologic: Alert , Normal motor function, Normal speech, No focal deficits noted.   Psychiatric: Affect normal, Judgment normal, Mood normal.         DIAGNOSTIC STUDIES / PROCEDURES    Laceration Repair Procedure    Indication: Laceration    Location/Description: left eyebrow    Procedure: The patient was placed in the appropriate position and anesthesia around the laceration was obtained by infiltration using LET gel. The area was then irrigated with normal saline. The laceration was closed with 5-0 Fast Absorbing Gut. There were no additional lacerations requiring repair.     Total repaired wound length: 1.5 cm.     Other Items: Suture count: 3    The patient tolerated the procedure well.    Complications: None        COURSE & MEDICAL DECISION MAKING    ED Observation Status? No; Patient does not meet criteria for ED Observation.     INITIAL ASSESSMENT, COURSE AND PLAN  Care Narrative: Patient presents with laceration to left eyebrow. No evidence of foreign body. No neurovascular injury.  No evidence of injury to the eye.  Irrigated thoroughly. Wound closed with dissolvable sutures. Tetanus was updated. No other wounds/injuries. Discharged home with return precautions.           ADDITIONAL PROBLEM LIST    Laceration left eyebrow    DISPOSITION AND DISCUSSIONS    Discharged home in stable condition    FINAL DIAGNOSIS  1. Facial laceration, initial encounter              Electronically signed by: Erma Linder M.D., 06/18/23 11:27 PM

## 2023-06-19 NOTE — ED NOTES
Checked two omnicells, no available lidocaine epinephrine 2%. Informed Mohit- NAM regarding the unavailability of the medication.

## 2023-06-19 NOTE — ED NOTES
Informed ERP if she can change  the dose of the order as  per instruction of NAM, changed the order and tried to pull, but still unavailable. Called Pharmacy in the Main and told this RN that there isn't available in the omnicell here in ER. Informed Mohit.

## 2023-06-19 NOTE — DISCHARGE INSTRUCTIONS
We have repaired your wound with dissolvable sutures.  These will dissolve over the next 5 to 7 days.  If they have not entirely dissolved you may go to your primary care doctor to get the rest taken out.    In the meantime, keep wound clean and avoid rubbing the wound as this will cause the sutures to fall out prematurely.    Even after the sutures have dissolved, be diligent in using sunscreen over the area as the skin will still be healing and this will help prevent scarring long-term.    Return to the emergency department if any signs of infection develop, wound reopens, or other concerns.

## 2023-06-19 NOTE — ED NOTES
Discharged in stable condition, alert and oriented, ambulatory. follow up appointment instructed. Health education imparted. Instructed to come back once infection noted. Pt verbalized understanding of the information given.

## 2023-06-19 NOTE — ED TRIAGE NOTES
"Chief Complaint   Patient presents with    Laceration     Pt was chopping fire woods and it flipped - hit on his left eyebrow about 30 minutes ago, sustained cut wound on the inner side of the left eyebrow. Bleeding noted during that time and applied pressure. Denies LOC and blood thinner medication. No active bleeding      /83   Pulse 76   Temp 36.8 °C (98.2 °F) (Temporal)   Resp 16   Ht 1.854 m (6' 1\")   Wt 117 kg (257 lb 0.9 oz)   SpO2 94%   BMI 33.91 kg/m²     "

## 2024-03-19 ENCOUNTER — HOSPITAL ENCOUNTER (OUTPATIENT)
Dept: LAB | Facility: MEDICAL CENTER | Age: 49
End: 2024-03-19
Attending: FAMILY MEDICINE
Payer: COMMERCIAL

## 2024-03-19 LAB
25(OH)D3 SERPL-MCNC: 59 NG/ML (ref 30–100)
ALBUMIN SERPL BCP-MCNC: 4.9 G/DL (ref 3.2–4.9)
ALBUMIN/GLOB SERPL: 1.6 G/DL
ALP SERPL-CCNC: 89 U/L (ref 30–99)
ALT SERPL-CCNC: 59 U/L (ref 2–50)
ANION GAP SERPL CALC-SCNC: 12 MMOL/L (ref 7–16)
AST SERPL-CCNC: 38 U/L (ref 12–45)
BILIRUB SERPL-MCNC: 0.7 MG/DL (ref 0.1–1.5)
BUN SERPL-MCNC: 15 MG/DL (ref 8–22)
CALCIUM ALBUM COR SERPL-MCNC: 8.9 MG/DL (ref 8.5–10.5)
CALCIUM SERPL-MCNC: 9.6 MG/DL (ref 8.5–10.5)
CHLORIDE SERPL-SCNC: 106 MMOL/L (ref 96–112)
CO2 SERPL-SCNC: 23 MMOL/L (ref 20–33)
CREAT SERPL-MCNC: 0.95 MG/DL (ref 0.5–1.4)
CREAT UR-MCNC: 57.38 MG/DL
CRP SERPL HS-MCNC: 1 MG/L (ref 0–3)
ERYTHROCYTE [SEDIMENTATION RATE] IN BLOOD BY WESTERGREN METHOD: 5 MM/HOUR (ref 0–20)
GFR SERPLBLD CREATININE-BSD FMLA CKD-EPI: 97 ML/MIN/1.73 M 2
GGT SERPL-CCNC: 54 U/L (ref 7–51)
GLOBULIN SER CALC-MCNC: 3 G/DL (ref 1.9–3.5)
GLUCOSE SERPL-MCNC: 91 MG/DL (ref 65–99)
HAV IGM SERPL QL IA: NORMAL
HBV CORE IGM SER QL: NORMAL
HBV SURFACE AG SER QL: NORMAL
HCYS SERPL-SCNC: 11.58 UMOL/L
MICROALBUMIN UR-MCNC: <1.2 MG/DL
MICROALBUMIN/CREAT UR: NORMAL MG/G (ref 0–30)
POTASSIUM SERPL-SCNC: 4.1 MMOL/L (ref 3.6–5.5)
PROT SERPL-MCNC: 7.9 G/DL (ref 6–8.2)
SODIUM SERPL-SCNC: 141 MMOL/L (ref 135–145)
T3FREE SERPL-MCNC: 3.69 PG/ML (ref 2–4.4)
T4 FREE SERPL-MCNC: 1.15 NG/DL (ref 0.93–1.7)
TSH SERPL DL<=0.005 MIU/L-ACNC: 4.39 UIU/ML (ref 0.38–5.33)

## 2024-03-19 PROCEDURE — 82542 COL CHROMOTOGRAPHY QUAL/QUAN: CPT

## 2024-03-19 PROCEDURE — 83704 LIPOPROTEIN BLD QUAN PART: CPT

## 2024-03-19 PROCEDURE — 83090 ASSAY OF HOMOCYSTEINE: CPT

## 2024-03-19 PROCEDURE — 80053 COMPREHEN METABOLIC PANEL: CPT

## 2024-03-19 PROCEDURE — 83698 ASSAY LIPOPROTEIN PLA2: CPT

## 2024-03-19 PROCEDURE — 86141 C-REACTIVE PROTEIN HS: CPT

## 2024-03-19 PROCEDURE — 36415 COLL VENOUS BLD VENIPUNCTURE: CPT

## 2024-03-19 PROCEDURE — 83520 IMMUNOASSAY QUANT NOS NONAB: CPT

## 2024-03-19 PROCEDURE — 82977 ASSAY OF GGT: CPT

## 2024-03-19 PROCEDURE — 83525 ASSAY OF INSULIN: CPT

## 2024-03-19 PROCEDURE — 82570 ASSAY OF URINE CREATININE: CPT

## 2024-03-19 PROCEDURE — 82306 VITAMIN D 25 HYDROXY: CPT

## 2024-03-19 PROCEDURE — 369999 HCHG MISC LAB CHARGE

## 2024-03-19 PROCEDURE — 80061 LIPID PANEL: CPT

## 2024-03-19 PROCEDURE — 86705 HEP B CORE ANTIBODY IGM: CPT

## 2024-03-19 PROCEDURE — 82043 UR ALBUMIN QUANTITATIVE: CPT

## 2024-03-19 PROCEDURE — 86709 HEPATITIS A IGM ANTIBODY: CPT

## 2024-03-19 PROCEDURE — 83018 HEAVY METAL QUAN EACH NES: CPT

## 2024-03-19 PROCEDURE — 83735 ASSAY OF MAGNESIUM: CPT

## 2024-03-19 PROCEDURE — 84481 FREE ASSAY (FT-3): CPT

## 2024-03-19 PROCEDURE — 84439 ASSAY OF FREE THYROXINE: CPT

## 2024-03-19 PROCEDURE — 84443 ASSAY THYROID STIM HORMONE: CPT

## 2024-03-19 PROCEDURE — 83695 ASSAY OF LIPOPROTEIN(A): CPT

## 2024-03-19 PROCEDURE — 84431 THROMBOXANE URINE: CPT

## 2024-03-19 PROCEDURE — 85652 RBC SED RATE AUTOMATED: CPT

## 2024-03-19 PROCEDURE — 87340 HEPATITIS B SURFACE AG IA: CPT

## 2024-03-21 LAB
INSULIN P FAST SERPL-ACNC: 7 UIU/ML (ref 3–25)
LPA SERPL-MCNC: <6 MG/DL

## 2024-03-22 LAB
MAGNESIUM RBC-SCNC: 5.8 MG/DL (ref 3.6–7.5)
UNCODED TEST RESULT 95040: NORMAL

## 2024-03-25 ENCOUNTER — OFFICE VISIT (OUTPATIENT)
Dept: MEDICAL GROUP | Facility: MEDICAL CENTER | Age: 49
End: 2024-03-25
Payer: COMMERCIAL

## 2024-03-25 VITALS
SYSTOLIC BLOOD PRESSURE: 110 MMHG | HEART RATE: 78 BPM | HEIGHT: 73 IN | BODY MASS INDEX: 34.19 KG/M2 | OXYGEN SATURATION: 98 % | RESPIRATION RATE: 16 BRPM | TEMPERATURE: 97.9 F | WEIGHT: 258 LBS | DIASTOLIC BLOOD PRESSURE: 66 MMHG

## 2024-03-25 DIAGNOSIS — R74.8 ELEVATED LIVER ENZYMES: ICD-10-CM

## 2024-03-25 DIAGNOSIS — R13.10 DYSPHAGIA, UNSPECIFIED TYPE: ICD-10-CM

## 2024-03-25 DIAGNOSIS — E78.2 MIXED HYPERLIPIDEMIA: ICD-10-CM

## 2024-03-25 DIAGNOSIS — R73.03 PREDIABETES: ICD-10-CM

## 2024-03-25 DIAGNOSIS — K21.9 GASTROESOPHAGEAL REFLUX DISEASE, UNSPECIFIED WHETHER ESOPHAGITIS PRESENT: ICD-10-CM

## 2024-03-25 DIAGNOSIS — E03.8 SUBCLINICAL HYPOTHYROIDISM: ICD-10-CM

## 2024-03-25 DIAGNOSIS — L98.9 SKIN LESION: ICD-10-CM

## 2024-03-25 LAB
CHOLEST SERPL-MCNC: 169 MG/DL
HDL PARTICAL NO Q4363: 35.7 UMOL/L
HDL SIZE Q4361: 8.4 NM
HDLC SERPL-MCNC: 46 MG/DL (ref 40–59)
HLD.LARGE SERPL-SCNC: <2.8 UMOL/L
L VLDL PART NO Q4357: <1.5 NMOL/L
LDL SERPL QN: 20.5 NM
LDL SERPL-SCNC: 1391 NMOL/L
LDL SMALL SERPL-SCNC: 736 NMOL/L
LDLC SERPL CALC-MCNC: 95 MG/DL
PATHOLOGY STUDY: ABNORMAL
TRIGL SERPL-MCNC: 142 MG/DL (ref 30–149)
VLDL SIZE Q4362: 43.6 NM

## 2024-03-25 PROCEDURE — 3074F SYST BP LT 130 MM HG: CPT | Performed by: FAMILY MEDICINE

## 2024-03-25 PROCEDURE — 3078F DIAST BP <80 MM HG: CPT | Performed by: FAMILY MEDICINE

## 2024-03-25 PROCEDURE — 99214 OFFICE O/P EST MOD 30 MIN: CPT | Performed by: FAMILY MEDICINE

## 2024-03-25 RX ORDER — ROSUVASTATIN CALCIUM 10 MG/1
10 TABLET, COATED ORAL EVERY EVENING
Qty: 90 TABLET | Refills: 3 | Status: SHIPPED | OUTPATIENT
Start: 2024-03-25

## 2024-03-25 RX ORDER — METFORMIN HYDROCHLORIDE 500 MG/1
500 TABLET, EXTENDED RELEASE ORAL DAILY
Qty: 90 TABLET | Refills: 3 | Status: SHIPPED | OUTPATIENT
Start: 2024-03-25

## 2024-03-25 RX ORDER — LEVOTHYROXINE SODIUM 0.07 MG/1
75 TABLET ORAL
COMMUNITY
End: 2024-03-25 | Stop reason: SDUPTHER

## 2024-03-25 RX ORDER — LEVOTHYROXINE SODIUM 0.07 MG/1
75 TABLET ORAL
Qty: 90 TABLET | Refills: 3 | Status: SHIPPED | OUTPATIENT
Start: 2024-03-25

## 2024-03-25 ASSESSMENT — PATIENT HEALTH QUESTIONNAIRE - PHQ9: CLINICAL INTERPRETATION OF PHQ2 SCORE: 0

## 2024-03-25 NOTE — PROGRESS NOTES
"Verbal consent was acquired by the patient to use [employer] ambient listening note generation during this visit    Subjective:     CC: \"lab review\"    History of Present Illness  The patient is a 48-year-old male. We are following up on some labs that he recently did and refilling some medicines.    He was supposed to do a follow-up lab last year, but he forgot to do it. He has been taking 75 mcg of Synthroid in the morning before he eats, which seems to be helping him. If he eats a heavy lunch, he practically passed out. If he goes to lighter things like salad and soup, he is generally okay. He has more swelling in his face that he had before and has tiredness in the afternoon. His liver was fatty last year because he had a lot of sugar intake. He was on a cleanse for 3 months and did really well, but then he fell off the wagon a little bit. He does not think he has kidney problems. He has been doing physical labor a lot. He takes ibuprofen and Tylenol when he gets sore. He was borderline prediabetic and is on metformin. He has not been taking any supplements for magnesium. He takes fish oil. He is still taking rosuvastatin, metformin, and Synthroid. He is trying to make changes to his diet. He feels better when he cut out all sugar, wheat, and gluten. He has stayed off a lot of dairy. He occasionally eats ice cream. He denies any chest pain when he is doing manual labor.    He had an endoscopy when he was in [place]. They stretched his esophagus out because he could not swallow. They found the size of his esophagus was the size of a dime. He had to do 2 stretches. He has not had one done since 4 years. He had an episode last week and 2 weeks ago. He had to basically throw it up. It is not coming out of his stomach, it is just sitting in his esophagus. It tends to not be as much of a problem if he takes heartburn medicine. He is supposed to take it every couple of days, but for some reason, he does not have it in " "his regular vitamins.    He has potential cancer and some more spots on his back. He has not had any work done on his skin before. He has not seen a dermatologist. He has a history of skin cancer.   His mother has a history of skin cancer.          Objective:     Exam:  /66   Pulse 78   Temp 36.6 °C (97.9 °F) (Temporal)   Resp 16   Ht 1.854 m (6' 1\")   Wt 117 kg (258 lb)   SpO2 98%   BMI 34.04 kg/m²  Body mass index is 34.04 kg/m².    Physical Exam  Vitals reviewed.   Constitutional:       General: He is not in acute distress.     Appearance: Normal appearance. He is obese.   HENT:      Head: Normocephalic and atraumatic.   Cardiovascular:      Rate and Rhythm: Normal rate and regular rhythm.      Heart sounds: Normal heart sounds.   Pulmonary:      Effort: Pulmonary effort is normal. No respiratory distress.      Breath sounds: Normal breath sounds.   Skin:     General: Skin is warm and dry.   Neurological:      Mental Status: He is alert. Mental status is at baseline.      Gait: Gait normal.   Psychiatric:         Mood and Affect: Mood normal.         Behavior: Behavior normal.               Results  Laboratory Studies  Labs from 03/19/2024 were reviewed with the patient.      Assessment & Plan:       1. Subclinical hypothyroidism  - levothyroxine (SYNTHROID) 75 MCG Tab; Take 1 Tablet by mouth every morning on an empty stomach.  Dispense: 90 Tablet; Refill: 3    2. Elevated liver enzymes    3. Dysphagia, unspecified type  - Referral to Gastroenterology  - DX-ESOPH. FLUORO (BARIUM SWALLOW); Future    4. Gastroesophageal reflux disease, unspecified whether esophagitis present  - Referral to Gastroenterology  - DX-ESOPH. FLUORO (BARIUM SWALLOW); Future    5. Skin lesion  - Referral to Dermatology    6. Prediabetes  - metFORMIN ER (GLUCOPHAGE XR) 500 MG TABLET SR 24 HR; Take 1 Tablet by mouth every day.  Dispense: 90 Tablet; Refill: 3    7. Mixed hyperlipidemia  - rosuvastatin (CRESTOR) 10 MG Tab; Take 1 " Tablet by mouth every evening.  Dispense: 90 Tablet; Refill: 3      Assessment & Plan  1. Hypothyroidism.  His free thyroxine, T3, and TSH are all in the appropriate range. Continue levothyroxine 75 mcg.    2. Elevated liver enzymes.  His GGT is mildly elevated. His ALT is barely above at 59 His liver enzymes themselves are not bad. Most of them are at the end of normal range. Tylenol and alcohol are the more common causes of liver problems. We will remeasure his GGT in the future and monitor his ALT.    3. Dysphagia and 4. GERD.  I will give him an order for a swallow study. I will refer him to GI. Continue PPI for now.    5. Skin cancer screening.  I will refer him to dermatology for skin cancer screening    6. Prediabetes  Seems to be improving overall. His fasting insulin is 7. His fasting sugar looks good at 91. Patient making good lifestyle choices for the most part. Tolerating metformin 500 mg daily. Will continue current regimen.    7. Dyslipidemia.   His CRP is on the border of low risk and average risk for cardiac events. His homocysteine is barely elevated at 11.5. His lipoprotein A is nice and low. His LDL-P is still elevated but significantly improved. No complaints of exertional chest pain or shortness of breath. Will continue rosuvastatin 10 mg.    Follow-up  The patient will follow up in 1 year.         Return in about 1 year (around 3/25/2025), or if symptoms worsen or fail to improve, for Annual Visit, Lab F/U.      This note was created using voice recognition software (Dragon). The accuracy of the dictation is limited by the abilities of the software. I have reviewed the note prior to signing, however some errors in grammar and context are still possible. If you have any questions related to this note please do not hesitate to contact our office.

## 2024-03-28 LAB — TEST NAME 95000: NORMAL

## 2024-03-29 LAB — MISCELLANEOUS LAB RESULT MISCLAB: NORMAL

## 2024-04-04 LAB
MISCELLANEOUS LAB RESULT MISCLAB: NORMAL
TEST NAME 95000: NORMAL

## 2024-05-06 ENCOUNTER — HOSPITAL ENCOUNTER (OUTPATIENT)
Dept: RADIOLOGY | Facility: MEDICAL CENTER | Age: 49
End: 2024-05-06
Attending: FAMILY MEDICINE
Payer: COMMERCIAL

## 2024-05-06 DIAGNOSIS — R13.10 DYSPHAGIA, UNSPECIFIED TYPE: ICD-10-CM

## 2024-05-06 DIAGNOSIS — K21.9 GASTROESOPHAGEAL REFLUX DISEASE, UNSPECIFIED WHETHER ESOPHAGITIS PRESENT: ICD-10-CM

## 2024-05-06 RX ADMIN — ANTACID/ANTIFLATULENT 1 PACKET: 380; 550; 10; 10 GRANULE, EFFERVESCENT ORAL at 15:15

## 2024-05-06 RX ADMIN — BARIUM SULFATE 700 MG: 700 TABLET ORAL at 15:15

## 2024-06-04 ENCOUNTER — APPOINTMENT (RX ONLY)
Dept: URBAN - METROPOLITAN AREA CLINIC 4 | Facility: CLINIC | Age: 49
Setting detail: DERMATOLOGY
End: 2024-06-04

## 2024-06-04 DIAGNOSIS — Z71.89 OTHER SPECIFIED COUNSELING: ICD-10-CM

## 2024-06-04 DIAGNOSIS — L82.1 OTHER SEBORRHEIC KERATOSIS: ICD-10-CM

## 2024-06-04 DIAGNOSIS — D22 MELANOCYTIC NEVI: ICD-10-CM

## 2024-06-04 DIAGNOSIS — L81.4 OTHER MELANIN HYPERPIGMENTATION: ICD-10-CM

## 2024-06-04 PROBLEM — D23.72 OTHER BENIGN NEOPLASM OF SKIN OF LEFT LOWER LIMB, INCLUDING HIP: Status: ACTIVE | Noted: 2024-06-04

## 2024-06-04 PROBLEM — D22.72 MELANOCYTIC NEVI OF LEFT LOWER LIMB, INCLUDING HIP: Status: ACTIVE | Noted: 2024-06-04

## 2024-06-04 PROBLEM — D22.5 MELANOCYTIC NEVI OF TRUNK: Status: ACTIVE | Noted: 2024-06-04

## 2024-06-04 PROBLEM — D22.4 MELANOCYTIC NEVI OF SCALP AND NECK: Status: ACTIVE | Noted: 2024-06-04

## 2024-06-04 PROCEDURE — ? COUNSELING

## 2024-06-04 PROCEDURE — ? SUNSCREEN RECOMMENDATIONS

## 2024-06-04 PROCEDURE — 99203 OFFICE O/P NEW LOW 30 MIN: CPT

## 2024-06-04 PROCEDURE — ? LIQUID NITROGEN (COSMETIC)

## 2024-06-04 ASSESSMENT — LOCATION SIMPLE DESCRIPTION DERM
LOCATION SIMPLE: RIGHT PRETIBIAL REGION
LOCATION SIMPLE: SCALP
LOCATION SIMPLE: LEFT PRETIBIAL REGION
LOCATION SIMPLE: LEFT CHEEK
LOCATION SIMPLE: RIGHT FOREARM
LOCATION SIMPLE: LEFT FOREARM
LOCATION SIMPLE: LEFT UPPER BACK
LOCATION SIMPLE: RIGHT UPPER BACK
LOCATION SIMPLE: RIGHT CHEEK
LOCATION SIMPLE: LEFT FOOT

## 2024-06-04 ASSESSMENT — LOCATION ZONE DERM
LOCATION ZONE: LEG
LOCATION ZONE: FACE
LOCATION ZONE: SCALP
LOCATION ZONE: FEET
LOCATION ZONE: TRUNK
LOCATION ZONE: ARM

## 2024-06-04 ASSESSMENT — LOCATION DETAILED DESCRIPTION DERM
LOCATION DETAILED: RIGHT SUPERIOR CENTRAL MALAR CHEEK
LOCATION DETAILED: LEFT SUPERIOR LATERAL MALAR CHEEK
LOCATION DETAILED: LEFT INFERIOR LATERAL MALAR CHEEK
LOCATION DETAILED: RIGHT MEDIAL MALAR CHEEK
LOCATION DETAILED: RIGHT SUPERIOR MEDIAL UPPER BACK
LOCATION DETAILED: LEFT MEDIAL UPPER BACK
LOCATION DETAILED: LEFT PROXIMAL DORSAL FOREARM
LOCATION DETAILED: LEFT PROXIMAL PRETIBIAL REGION
LOCATION DETAILED: LEFT DORSAL FOOT
LOCATION DETAILED: RIGHT DISTAL DORSAL FOREARM
LOCATION DETAILED: RIGHT CENTRAL MALAR CHEEK
LOCATION DETAILED: RIGHT PROXIMAL DORSAL FOREARM
LOCATION DETAILED: RIGHT SUPERIOR PARIETAL SCALP
LOCATION DETAILED: RIGHT PROXIMAL PRETIBIAL REGION
LOCATION DETAILED: LEFT DISTAL DORSAL FOREARM

## 2024-06-04 NOTE — PROCEDURE: LIQUID NITROGEN (COSMETIC)
Price (Use Numbers Only, No Special Characters Or $): 80
Detail Level: Detailed
Render Post-Care Instructions In Note?: no
Consent: The patient's consent was obtained including but not limited to risks of crusting, scabbing, blistering, scarring, darker or lighter pigmentary change, recurrence, incomplete removal and infection. The patient understands that the procedure is cosmetic in nature and is not covered by insurance.
Billing Information: Bill by Static Price
Show Spray Paint Technique Variable?: Yes
Spray Paint Text: The liquid nitrogen was applied to the skin utilizing a spray paint frosting technique.
Post-Care Instructions: I reviewed with the patient in detail post-care instructions. Patient is to wear sunprotection, and avoid picking at any of the treated lesions. Pt may apply Vaseline to crusted or scabbing areas.

## 2024-11-08 DIAGNOSIS — E78.2 MIXED HYPERLIPIDEMIA: ICD-10-CM

## 2024-11-08 DIAGNOSIS — R73.03 PREDIABETES: ICD-10-CM

## 2024-11-08 DIAGNOSIS — R79.89 ELEVATED TSH: ICD-10-CM

## 2024-11-08 DIAGNOSIS — Z11.4 ENCOUNTER FOR SCREENING FOR HIV: ICD-10-CM

## 2024-11-08 DIAGNOSIS — R74.8 ELEVATED LIVER ENZYMES: ICD-10-CM

## 2024-11-08 DIAGNOSIS — K21.9 GASTROESOPHAGEAL REFLUX DISEASE, UNSPECIFIED WHETHER ESOPHAGITIS PRESENT: ICD-10-CM

## 2024-11-08 DIAGNOSIS — Z11.59 NEED FOR HEPATITIS C SCREENING TEST: ICD-10-CM

## 2024-11-08 DIAGNOSIS — E66.811 OBESITY (BMI 30.0-34.9): ICD-10-CM

## 2024-11-08 RX ORDER — METFORMIN HYDROCHLORIDE 500 MG/1
1000 TABLET, EXTENDED RELEASE ORAL 2 TIMES DAILY
Qty: 360 TABLET | Refills: 3 | Status: SHIPPED | OUTPATIENT
Start: 2024-11-08

## 2024-11-08 NOTE — TELEPHONE ENCOUNTER
Pt comment:  Originally I was taking 4 tablets a day and this is only 1 per day.  I'm not getting the bennefits I onces had with a larger dose can you increase to 4 per day?

## 2024-11-08 NOTE — TELEPHONE ENCOUNTER
Received request via: Patient    Was the patient seen in the last year in this department? Yes    Does the patient have an active prescription (recently filled or refills available) for medication(s) requested? No    Pharmacy Name:   [facility] - [id number]  [facility] [address]  Phone: [phone] Fax: [phone]       Does the patient have custodial Plus and need 100-day supply? (This applies to ALL medications) Patient does not have SCP

## 2025-05-13 LAB
ALBUMIN SERPL-MCNC: 4.6 G/DL (ref 3.8–4.9)
ALP SERPL-CCNC: 83 IU/L (ref 44–121)
ALT SERPL-CCNC: 48 IU/L (ref 0–44)
APO B SERPL-MCNC: 92 MG/DL
AST SERPL-CCNC: 26 IU/L (ref 0–40)
BASOPHILS # BLD AUTO: 0.1 X10E3/UL (ref 0–0.2)
BASOPHILS NFR BLD AUTO: 1 %
BILIRUB SERPL-MCNC: 0.3 MG/DL (ref 0–1.2)
BUN SERPL-MCNC: 15 MG/DL (ref 6–24)
BUN/CREAT SERPL: 13 (ref 9–20)
CALCIUM SERPL-MCNC: 9.4 MG/DL (ref 8.7–10.2)
CHLORIDE SERPL-SCNC: 107 MMOL/L (ref 96–106)
CHOLEST SERPL-MCNC: 191 MG/DL (ref 100–199)
CO2 SERPL-SCNC: 17 MMOL/L (ref 20–29)
CREAT SERPL-MCNC: 1.12 MG/DL (ref 0.76–1.27)
CRP SERPL HS-MCNC: 0.79 MG/L (ref 0–3)
EGFRCR SERPLBLD CKD-EPI 2021: 80 ML/MIN/1.73
EOSINOPHIL # BLD AUTO: 0.4 X10E3/UL (ref 0–0.4)
EOSINOPHIL NFR BLD AUTO: 6 %
ERYTHROCYTE [DISTWIDTH] IN BLOOD BY AUTOMATED COUNT: 12.5 % (ref 11.6–15.4)
GGT SERPL-CCNC: 60 IU/L (ref 0–65)
GLOBULIN SER CALC-MCNC: 2.2 G/DL (ref 1.5–4.5)
GLUCOSE SERPL-MCNC: 99 MG/DL (ref 70–99)
HBA1C MFR BLD: 5.7 % (ref 4.8–5.6)
HCT VFR BLD AUTO: 47.2 % (ref 37.5–51)
HCV IGG SERPL QL IA: NON REACTIVE
HCYS SERPL-SCNC: 11.3 UMOL/L (ref 0–14.5)
HDLC SERPL-MCNC: 50 MG/DL
HGB BLD-MCNC: 16.4 G/DL (ref 13–17.7)
HIV 1+2 AB+HIV1 P24 AG SERPL QL IA: NON REACTIVE
IMM GRANULOCYTES # BLD AUTO: 0 X10E3/UL (ref 0–0.1)
IMM GRANULOCYTES NFR BLD AUTO: 0 %
IMMATURE CELLS  115398: NORMAL
INSULIN SERPL-ACNC: 7.2 UIU/ML (ref 2.6–24.9)
LDL CALC COMMENT:: ABNORMAL
LDLC SERPL CALC-MCNC: 116 MG/DL (ref 0–99)
LYMPHOCYTES # BLD AUTO: 2.7 X10E3/UL (ref 0.7–3.1)
LYMPHOCYTES NFR BLD AUTO: 41 %
MCH RBC QN AUTO: 32.4 PG (ref 26.6–33)
MCHC RBC AUTO-ENTMCNC: 34.7 G/DL (ref 31.5–35.7)
MCV RBC AUTO: 93 FL (ref 79–97)
MONOCYTES # BLD AUTO: 0.6 X10E3/UL (ref 0.1–0.9)
MONOCYTES NFR BLD AUTO: 9 %
MORPHOLOGY BLD-IMP: NORMAL
NEUTROPHILS # BLD AUTO: 2.9 X10E3/UL (ref 1.4–7)
NEUTROPHILS NFR BLD AUTO: 43 %
NRBC BLD AUTO-RTO: NORMAL %
PLATELET # BLD AUTO: 293 X10E3/UL (ref 150–450)
POTASSIUM SERPL-SCNC: 4.4 MMOL/L (ref 3.5–5.2)
PROT SERPL-MCNC: 6.8 G/DL (ref 6–8.5)
RBC # BLD AUTO: 5.06 X10E6/UL (ref 4.14–5.8)
SODIUM SERPL-SCNC: 140 MMOL/L (ref 134–144)
TRIGL SERPL-MCNC: 141 MG/DL (ref 0–149)
TSH SERPL DL<=0.005 MIU/L-ACNC: 3.54 UIU/ML (ref 0.45–4.5)
VLDLC SERPL CALC-MCNC: 25 MG/DL (ref 5–40)
WBC # BLD AUTO: 6.7 X10E3/UL (ref 3.4–10.8)

## 2025-05-16 ENCOUNTER — RESULTS FOLLOW-UP (OUTPATIENT)
Dept: MEDICAL GROUP | Facility: MEDICAL CENTER | Age: 50
End: 2025-05-16
Payer: COMMERCIAL

## 2025-05-20 ENCOUNTER — OFFICE VISIT (OUTPATIENT)
Dept: MEDICAL GROUP | Facility: MEDICAL CENTER | Age: 50
End: 2025-05-20
Payer: COMMERCIAL

## 2025-05-20 VITALS
DIASTOLIC BLOOD PRESSURE: 64 MMHG | SYSTOLIC BLOOD PRESSURE: 122 MMHG | RESPIRATION RATE: 20 BRPM | TEMPERATURE: 97.3 F | HEART RATE: 99 BPM | BODY MASS INDEX: 34.91 KG/M2 | OXYGEN SATURATION: 93 % | WEIGHT: 263.4 LBS | HEIGHT: 73 IN

## 2025-05-20 DIAGNOSIS — Z12.11 COLON CANCER SCREENING: ICD-10-CM

## 2025-05-20 DIAGNOSIS — R13.10 DYSPHAGIA, UNSPECIFIED TYPE: ICD-10-CM

## 2025-05-20 DIAGNOSIS — E78.2 MIXED HYPERLIPIDEMIA: ICD-10-CM

## 2025-05-20 DIAGNOSIS — G89.29 CHRONIC PAIN OF RIGHT KNEE: ICD-10-CM

## 2025-05-20 DIAGNOSIS — M25.561 CHRONIC PAIN OF RIGHT KNEE: ICD-10-CM

## 2025-05-20 DIAGNOSIS — R73.03 PREDIABETES: ICD-10-CM

## 2025-05-20 DIAGNOSIS — K21.9 GASTROESOPHAGEAL REFLUX DISEASE, UNSPECIFIED WHETHER ESOPHAGITIS PRESENT: ICD-10-CM

## 2025-05-20 DIAGNOSIS — E03.8 SUBCLINICAL HYPOTHYROIDISM: ICD-10-CM

## 2025-05-20 PROCEDURE — 3078F DIAST BP <80 MM HG: CPT | Performed by: FAMILY MEDICINE

## 2025-05-20 PROCEDURE — 99214 OFFICE O/P EST MOD 30 MIN: CPT | Performed by: FAMILY MEDICINE

## 2025-05-20 PROCEDURE — 3074F SYST BP LT 130 MM HG: CPT | Performed by: FAMILY MEDICINE

## 2025-05-20 RX ORDER — ROSUVASTATIN CALCIUM 10 MG/1
10 TABLET, COATED ORAL EVERY EVENING
Qty: 90 TABLET | Refills: 3 | Status: SHIPPED | OUTPATIENT
Start: 2025-05-20

## 2025-05-20 RX ORDER — METFORMIN HYDROCHLORIDE 500 MG/1
1000 TABLET, EXTENDED RELEASE ORAL 2 TIMES DAILY
Qty: 360 TABLET | Refills: 3 | Status: SHIPPED | OUTPATIENT
Start: 2025-05-20

## 2025-05-20 RX ORDER — LEVOTHYROXINE SODIUM 75 UG/1
75 TABLET ORAL
Qty: 90 TABLET | Refills: 3 | Status: SHIPPED | OUTPATIENT
Start: 2025-05-20

## 2025-05-20 ASSESSMENT — FIBROSIS 4 INDEX: FIB4 SCORE: 0.65

## 2025-05-20 ASSESSMENT — PATIENT HEALTH QUESTIONNAIRE - PHQ9: CLINICAL INTERPRETATION OF PHQ2 SCORE: 0

## 2025-05-20 NOTE — PROGRESS NOTES
"Verbal consent was acquired by the patient to use [employer] ambient listening note generation during this visit    Subjective:     CC: \"Lab follow-up, lifestyle discussion, medication discussion\"    History of Present Illness  The patient presents for evaluation of hyperlipidemia, prediabetes, hypothyroidism, acid reflux, and right knee pain.    Hyperlipidemia  He has been engaging in regular physical activity, specifically walking, achieving approximately 10,000 steps daily. Despite this, he reports minimal weight loss. He has also been supplementing with CoQ10 200 mg. He is currently on rosuvastatin 10 mg.  - Alleviating/Aggravating Factors: Regular physical activity (walking 10,000 steps daily), CoQ10 200 mg, rosuvastatin 10 mg.  - Severity: Minimal weight loss despite regular physical activity.    Prediabetes  He acknowledges a high carbohydrate intake in his diet. He has reduced his sugar intake but has not completely eliminated it. He is currently on metformin 500 mg twice daily.  - Alleviating/Aggravating Factors: Reduced sugar intake, metformin 500 mg twice daily.    Hypothyroidism  He is on levothyroxine 75 mcg for his hypothyroidism.  - Alleviating/Aggravating Factors: Levothyroxine 75 mcg.    Acid Reflux  He experiences acid reflux, which he manages with Prilosec taken every other day. He has undergone esophageal dilation twice in the past, with the most recent procedure being over 5 years ago. He maintains hydration by consuming large amounts of water during meals to prevent food from getting stuck. He reports that his acid reflux symptoms worsen if he eats late or consumes acidic foods such as lemon bars or liquor.  - Onset: Symptoms worsen if he eats late or consumes acidic foods.  - Duration: Managed with Prilosec every other day.  - Character: Acid reflux.  - Alleviating/Aggravating Factors: Prilosec as needed, consuming large amounts of water during meals, avoiding late meals and acidic foods.  - " "Severity: Symptoms worsen with late meals or acidic foods.    Right Knee Pain  He recently consulted an orthopedic specialist due to severe pain in his right knee, a symptom he had not previously experienced. The pain was localized beneath the kneecap. An x-ray revealed a slight reduction in cartilage compared to his left knee. He was referred for physical therapy and received a steroid injection, which significantly alleviated his pain. The pain typically occurs when he is at rest, particularly while driving, and can be relieved by straightening his leg.  - Onset: Recently consulted an orthopedic specialist.  - Location: Beneath the kneecap.  - Character: Severe pain.  - Alleviating/Aggravating Factors: Physical therapy, steroid injection, straightening leg.  - Timing: Pain typically occurs at rest, particularly while driving.  - Severity: Significant alleviation of pain after steroid injection.    PAST SURGICAL HISTORY:  Esophageal dilation (twice, over 5 years ago)    FAMILY HISTORY  The patient mentions that all his siblings have thyroid issues.          Objective:     Exam:  /64   Pulse 99   Temp 36.3 °C (97.3 °F) (Temporal)   Resp 20   Ht 1.854 m (6' 1\")   Wt 119 kg (263 lb 6.4 oz)   SpO2 93%   BMI 34.75 kg/m²  Body mass index is 34.75 kg/m².    Physical Exam  General Appearance: Normal.  Vital signs: Within normal limits.  HEENT: Within normal limits.  Respiratory: Clear to auscultation, no wheezing, rales or rhonchi.  Skin: Warm and dry, no rash.  Neurological: Normal.  Other observations: Oxygen saturation is at 93%.  Physical Exam        Results  Labs   - LDL: 116 mg/dL   - VLDL: 25 mg/dL   - Apolipoprotein B: 92 mg/dL   - Fasting insulin: 7.2 uIU/mL   - Hemoglobin A1c: 5.7%   - CRP: 0.79 mg/L   - Fasting sugar: 99 mg/dL   - Creatinine: 1.12 mg/dL   - GFR: 80 mL/min/1.73m2   - Chloride: 107 mmol/L   - CO2: Low   - TSH: 05/11/2025, 3.5 uIU/mL    Imaging   - X-ray of the right knee: Showed less " cartilage than the left knee      Assessment & Plan:       1. Mixed hyperlipidemia  - rosuvastatin (CRESTOR) 10 MG Tab; Take 1 Tablet by mouth every evening.  Dispense: 90 Tablet; Refill: 3  - Comp Metabolic Panel; Future  - Lipid Profile; Future    2. Prediabetes  - metFORMIN ER (GLUCOPHAGE XR) 500 MG TABLET SR 24 HR; Take 2 Tablets by mouth 2 times a day.  Dispense: 360 Tablet; Refill: 3  - Comp Metabolic Panel; Future  - HEMOGLOBIN A1C; Future    3. Subclinical hypothyroidism  - levothyroxine (SYNTHROID) 75 MCG Tab; Take 1 Tablet by mouth every morning on an empty stomach.  Dispense: 90 Tablet; Refill: 3    4. Gastroesophageal reflux disease, unspecified whether esophagitis present  - Referral to Gastroenterology  - Comp Metabolic Panel; Future    5. Dysphagia, unspecified type  - Referral to Gastroenterology  - Comp Metabolic Panel; Future    6. Colon cancer screening  - Referral to Gastroenterology    7. Chronic pain of right knee      Assessment & Plan  Hyperlipidemia  LDL levels have decreased from 187 to 116, though still above the target of <100. VLDL levels are within the normal range at 25.  Diagnostic plan: A metabolic panel, including kidney and liver function tests, CO2 levels, fasting sugar, A1c, and lipid panel, will be ordered in 6 months.  Treatment plan: Continued on rosuvastatin 10 mg for cholesterol management. Discussed the importance of diet, exercise, and fiber intake in managing cholesterol levels.    Prediabetes  Fasting insulin level is 7.2, indicating low risk of insulin resistance. Hemoglobin A1c is 5.7, at the onset of the prediabetic range. Fasting blood glucose level is 99.  Diagnostic plan: A metabolic panel, including kidney and liver function tests, CO2 levels, fasting sugar, A1c, and lipid panel, will be ordered in 6 months.  Treatment plan: Continued on metformin 500 mg twice daily for blood sugar control. Advised to maintain a healthy diet and regular exercise, including walking  after meals to help decrease glucose load.    Hypothyroidism  TSH level is 3.5, within the normal range.  Treatment plan: Continued on levothyroxine 75 mcg for hypothyroidism management. Discussed the effectiveness of the current dosage and the importance of regular monitoring.    Acid reflux  Advised to avoid late-night eating and acidic foods such as citrus and liquorice.  Diagnostic plan: Referral to gastroenterology will be made for an upper and lower endoscopy to assess for any changes or complications.  Clinical decision making: Discussed the potential need for regular or as-needed medication to manage symptoms.    Right knee pain  Reported significant improvement in right knee pain following a steroid injection and physical therapy referral from the orthopedic specialist.  Treatment plan: Advised to continue with physical therapy and monitor for any changes. Discussed the potential for arthritis and the importance of ongoing management and monitoring.    Follow-up: The patient will follow up in the naye.         Return in about 6 months (around 11/20/2025), or if symptoms worsen or fail to improve, for Annual Visit, Lab F/U.      This note was created using voice recognition software (Dragon). The accuracy of the dictation is limited by the abilities of the software. I have reviewed the note prior to signing, however some errors in grammar and context are still possible. If you have any questions related to this note please do not hesitate to contact our office.

## 2025-05-28 NOTE — Clinical Note
REFERRAL APPROVAL NOTICE         Sent on May 28, 2025                   Junior Kumari  [address]  [place] [address]                   Dear Mr. Kumari,    After a careful review of the medical information and benefit coverage, [facility] has processed your referral. See below for additional details.    If applicable, you must be actively enrolled with your insurance for coverage of the authorized service. If you have any questions regarding your coverage, please contact your insurance directly.    REFERRAL INFORMATION   Referral #:  [id number]  Referred-To Provider    Referred-By Provider:  Gastroenterology    Tramaine Drake D.O.   [facility]      [address] [facility] [address] [place] [address]  [phone] [address] [facility] [place] [address]  [phone]    Referral Start Date:  05/20/2025  Referral End Date:   05/20/2026             SCHEDULING  If you do not already have an appointment, please call [phone] to make an appointment.     MORE INFORMATION  If you do not already have a [employer] account, sign up at: [email or web address]  You can access your medical information, make appointments, see lab results, billing information, and more.  If you have questions regarding this referral, please contact  the [facility] Referrals department at:             [phone]. Monday - Friday 8:00AM - 5:00PM.     Sincerely,    [facility]